# Patient Record
Sex: FEMALE | Race: WHITE | URBAN - METROPOLITAN AREA
[De-identification: names, ages, dates, MRNs, and addresses within clinical notes are randomized per-mention and may not be internally consistent; named-entity substitution may affect disease eponyms.]

---
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
## 2025-06-09 ENCOUNTER — TRANSCRIBE ORDERS (OUTPATIENT)
Dept: OTHER | Age: 20
End: 2025-06-09

## 2025-06-09 DIAGNOSIS — Z13.6 SCREENING FOR CARDIOVASCULAR CONDITION: Primary | ICD-10-CM

## 2025-06-24 ENCOUNTER — OFFICE VISIT (OUTPATIENT)
Dept: CARDIOLOGY | Facility: CLINIC | Age: 20
End: 2025-06-24
Attending: OBSTETRICS & GYNECOLOGY
Payer: COMMERCIAL

## 2025-06-24 VITALS
HEIGHT: 70 IN | HEART RATE: 64 BPM | BODY MASS INDEX: 23.03 KG/M2 | WEIGHT: 160.9 LBS | DIASTOLIC BLOOD PRESSURE: 70 MMHG | SYSTOLIC BLOOD PRESSURE: 102 MMHG | RESPIRATION RATE: 16 BRPM

## 2025-06-24 DIAGNOSIS — Z82.49 FAMILY HISTORY OF ISCHEMIC HEART DISEASE: Primary | ICD-10-CM

## 2025-06-24 DIAGNOSIS — Z13.6 SCREENING FOR CARDIOVASCULAR CONDITION: ICD-10-CM

## 2025-06-24 DIAGNOSIS — Z82.49 FAMILY HISTORY OF HEART VALVE INSUFFICIENCY: ICD-10-CM

## 2025-06-24 LAB — APO A-I SERPL-MCNC: 18 MG/DL

## 2025-06-24 PROCEDURE — 3078F DIAST BP <80 MM HG: CPT | Performed by: STUDENT IN AN ORGANIZED HEALTH CARE EDUCATION/TRAINING PROGRAM

## 2025-06-24 PROCEDURE — 83695 ASSAY OF LIPOPROTEIN(A): CPT | Performed by: STUDENT IN AN ORGANIZED HEALTH CARE EDUCATION/TRAINING PROGRAM

## 2025-06-24 PROCEDURE — 99203 OFFICE O/P NEW LOW 30 MIN: CPT | Performed by: STUDENT IN AN ORGANIZED HEALTH CARE EDUCATION/TRAINING PROGRAM

## 2025-06-24 PROCEDURE — 36415 COLL VENOUS BLD VENIPUNCTURE: CPT | Performed by: STUDENT IN AN ORGANIZED HEALTH CARE EDUCATION/TRAINING PROGRAM

## 2025-06-24 PROCEDURE — 93000 ELECTROCARDIOGRAM COMPLETE: CPT | Performed by: GENERAL ACUTE CARE HOSPITAL

## 2025-06-24 PROCEDURE — G2211 COMPLEX E/M VISIT ADD ON: HCPCS | Performed by: STUDENT IN AN ORGANIZED HEALTH CARE EDUCATION/TRAINING PROGRAM

## 2025-06-24 PROCEDURE — 3074F SYST BP LT 130 MM HG: CPT | Performed by: STUDENT IN AN ORGANIZED HEALTH CARE EDUCATION/TRAINING PROGRAM

## 2025-06-24 RX ORDER — LEVONORGESTREL 19.5 MG/1
1 INTRAUTERINE DEVICE INTRAUTERINE
COMMUNITY

## 2025-06-24 NOTE — PATIENT INSTRUCTIONS
It was a pleasure meeting you today    In summary:    We will get an echocardiogram, ECG, and lipoprotein A to assess your cardiac risk.    Please call my nurse John Lindquist at [phone] if you have any questions or issues.    We will schedule a follow up visit in  the future if needed    Solitario Waldrop DO [facility]  Non-invasive Cardiologist  [facility]

## 2025-06-24 NOTE — LETTER
"2025    Yokasta Black MD  [address] [facility] [address]    RE: Gladys Isabel       Dear Colleague,     I had the pleasure of seeing Gladys Isabel in the [facility].    [facility]   [address]  [place] [id number]   [email or web address]   OFFICE: [phone]     CARDIOLOGY CLINIC NOTE     Thank you, Dr. Sarah Black, Yokasta Ramirez, for asking the [facility] team to see Ms. Gladys Isabel to evaluate New Patient           History of Present Illness   Ms. Gladys Isabel is a 20 year old female with no significant past history who presents for evaluation of cardiac risk.  The patient feels well, is active, has no symptoms.  The patient notes there is significant cardiac family history on both sides.  Her paternal grandfather  in his 40s or 50s from an MI.  Her maternal grandfather had an MI in his 70s, developed CHF and has a cardiac device.  Her maternal aunt suffered cardiac arrest in her 40s, and again in her 50s.  She was found to have a mitral valve problem and underwent MV replacement.  She also developed CHF.  The patient is unsure if she has CAD or not.           Medications  Allergies   Current Outpatient Medications   Medication Sig Dispense Refill     levonorgestrel (KYLEENA) 19.5 MG IUD 1 Intra Uterine Device by Intrauterine route.        No Known Allergies     Physical Examination Review of Systems   Vitals: /70 (BP Location: Right arm, Patient Position: Sitting, Cuff Size: Adult Regular)   Pulse 64   Resp 16   Ht 1.778 m (5' 10\")   Wt 73 kg (160 lb 14.4 oz)   LMP  (Approximate)   BMI 23.09 kg/m    BMI= Body mass index is 23.09 kg/m .  Wt Readings from Last 3 Encounters:   25 73 kg (160 lb 14.4 oz)       General: pleasant female. No acute distress.   Neck: No JVD  Lungs: clear to auscultation  COR:  regular rate and rhythm, No murmurs, rubs, or gallops  Extrem: No " "edema        Please refer above for cardiac ROS details.       Past History     Family History: No family history on file.     Social History:   Social History     Socioeconomic History     Marital status: Single     Spouse name: Not on file     Number of children: Not on file     Years of education: Not on file     Highest education level: Not on file   Occupational History     Not on file   Tobacco Use     Smoking status: Former     Types: Cigarettes     Passive exposure: Past     Smokeless tobacco: Never   Substance and Sexual Activity     Alcohol use: Yes     Comment: occ.     Drug use: Never     Sexual activity: Not on file   Other Topics Concern     Not on file   Social History Narrative     Not on file     Social Drivers of Health     Financial Resource Strain: Not on file   Food Insecurity: Low Risk  (2/1/2023)    Received from [facility]    Food Insecurity      Have there been times that your food ran out, and you didn't have money to get more?: No      Are there times that you worry that this might happen?: No   Transportation Needs: Low Risk  (2/1/2023)    Received from [facility]    Transportation Needs      Do you have trouble getting transportation to medical appointments?: No      How do you normally get to and from your appointments?: Not on file   Physical Activity: Not on file   Stress: Not on file   Social Connections: Not on file   Interpersonal Safety: Not on file   Housing Stability: Not on file (2/1/2023)            Lab Results    Chemistry/lipid CBC Cardiac Enzymes/BNP/TSH/INR   No results found for: \"CHOL\", \"HDL\", \"TRIG\", \"CHOLHDL\", \"CREATININE\", \"BUN\", \"NA\", \"CO2\" No results found for: \"WBC\", \"HGB\", \"HCT\", \"MCV\", \"PLT\" Lab Results   Component Value Date    TSH 0.70 12/11/2020          Cardiac Problems and Cardiac Diagnostics     Most Recent Cardiac testing:  ECG Personal interpretation  6/24/2025  NSR         Assessment/Recommendations "   Assessment:    Ms. Gladys Isabel is a 20 year old female with no significant past history who presents for evaluation of cardiac risk.     Family history of CAD   - Reviewed lipid profile which was normal   - Check LP(a)    Family history of mitral valve disorder   - Given that MVP can run in families will check an echocardiogram    RTC if needed based on results of testing         Solitario Waldrop DO [facility]  Non-invasive Cardiologist  [facility]               Thank you for allowing me to participate in the care of your patient.      Sincerely,     Solitario Waldrop DO     [facility]  cc:   Yokasta Black MD  [address] [place] [address]

## 2025-06-24 NOTE — PROGRESS NOTES
"  [facility]   [address]  [place] [address]   [email or web address]   OFFICE: [phone]     CARDIOLOGY CLINIC NOTE     Thank you, Dr. Sarah Black, Yokasta Ramirez, for asking the [facility] team to see Ms. Gladys Isabel to evaluate New Patient           History of Present Illness   Ms. Gladys Isabel is a 20 year old female with no significant past history who presents for evaluation of cardiac risk.  The patient feels well, is active, has no symptoms.  The patient notes there is significant cardiac family history on both sides.  Her paternal grandfather  in his 40s or 50s from an MI.  Her maternal grandfather had an MI in his 70s, developed CHF and has a cardiac device.  Her maternal aunt suffered cardiac arrest in her 40s, and again in her 50s.  She was found to have a mitral valve problem and underwent MV replacement.  She also developed CHF.  The patient is unsure if she has CAD or not.           Medications  Allergies   Current Outpatient Medications   Medication Sig Dispense Refill    levonorgestrel (KYLEENA) 19.5 MG IUD 1 Intra Uterine Device by Intrauterine route.        No Known Allergies     Physical Examination Review of Systems   Vitals: /70 (BP Location: Right arm, Patient Position: Sitting, Cuff Size: Adult Regular)   Pulse 64   Resp 16   Ht 1.778 m (5' 10\")   Wt 73 kg (160 lb 14.4 oz)   LMP  (Approximate)   BMI 23.09 kg/m    BMI= Body mass index is 23.09 kg/m .  Wt Readings from Last 3 Encounters:   25 73 kg (160 lb 14.4 oz)       General: pleasant female. No acute distress.   Neck: No JVD  Lungs: clear to auscultation  COR:  regular rate and rhythm, No murmurs, rubs, or gallops  Extrem: No edema        Please refer above for cardiac ROS details.       Past History     Family History: No family history on file.     Social History:   Social History     Socioeconomic History    Marital status: Single     " "Spouse name: Not on file    Number of children: Not on file    Years of education: Not on file    Highest education level: Not on file   Occupational History    Not on file   Tobacco Use    Smoking status: Former     Types: Cigarettes     Passive exposure: Past    Smokeless tobacco: Never   Substance and Sexual Activity    Alcohol use: Yes     Comment: occ.    Drug use: Never    Sexual activity: Not on file   Other Topics Concern    Not on file   Social History Narrative    Not on file     Social Drivers of Health     Financial Resource Strain: Not on file   Food Insecurity: Low Risk  (2/1/2023)    Received from [facility]    Food Insecurity     Have there been times that your food ran out, and you didn't have money to get more?: No     Are there times that you worry that this might happen?: No   Transportation Needs: Low Risk  (2/1/2023)    Received from [facility]    Transportation Needs     Do you have trouble getting transportation to medical appointments?: No     How do you normally get to and from your appointments?: Not on file   Physical Activity: Not on file   Stress: Not on file   Social Connections: Not on file   Interpersonal Safety: Not on file   Housing Stability: Not on file (2/1/2023)            Lab Results    Chemistry/lipid CBC Cardiac Enzymes/BNP/TSH/INR   No results found for: \"CHOL\", \"HDL\", \"TRIG\", \"CHOLHDL\", \"CREATININE\", \"BUN\", \"NA\", \"CO2\" No results found for: \"WBC\", \"HGB\", \"HCT\", \"MCV\", \"PLT\" Lab Results   Component Value Date    TSH 0.70 12/11/2020          Cardiac Problems and Cardiac Diagnostics     Most Recent Cardiac testing:  ECG Personal interpretation  6/24/2025  NSR         Assessment/Recommendations   Assessment:    Ms. Gladys Isabel is a 20 year old female with no significant past history who presents for evaluation of cardiac risk.     Family history of CAD   - Reviewed lipid profile which was normal   - Check LP(a)    Family history " of mitral valve disorder   - Given that MVP can run in families will check an echocardiogram    RTC if needed based on results of testing         Solitario Waldrop DO FACC  Non-invasive Cardiologist  [facility]

## 2025-06-25 ENCOUNTER — RESULTS FOLLOW-UP (OUTPATIENT)
Dept: CARDIOLOGY | Facility: CLINIC | Age: 20
End: 2025-06-25
Payer: COMMERCIAL

## 2025-06-25 LAB
ATRIAL RATE - MUSE: 78 BPM
DIASTOLIC BLOOD PRESSURE - MUSE: NORMAL MMHG
INTERPRETATION ECG - MUSE: NORMAL
P AXIS - MUSE: 45 DEGREES
PR INTERVAL - MUSE: 132 MS
QRS DURATION - MUSE: 84 MS
QT - MUSE: 402 MS
QTC - MUSE: 458 MS
R AXIS - MUSE: 70 DEGREES
SYSTOLIC BLOOD PRESSURE - MUSE: NORMAL MMHG
T AXIS - MUSE: 53 DEGREES
VENTRICULAR RATE- MUSE: 78 BPM

## 2025-07-10 ENCOUNTER — HOSPITAL ENCOUNTER (OUTPATIENT)
Dept: CARDIOLOGY | Facility: CLINIC | Age: 20
End: 2025-07-10
Attending: STUDENT IN AN ORGANIZED HEALTH CARE EDUCATION/TRAINING PROGRAM
Payer: COMMERCIAL

## 2025-07-10 DIAGNOSIS — Z82.49 FAMILY HISTORY OF ISCHEMIC HEART DISEASE: ICD-10-CM

## 2025-07-10 DIAGNOSIS — Z13.6 SCREENING FOR CARDIOVASCULAR CONDITION: ICD-10-CM

## 2025-07-10 DIAGNOSIS — Z82.49 FAMILY HISTORY OF HEART VALVE INSUFFICIENCY: ICD-10-CM

## 2025-07-10 LAB — LVEF ECHO: NORMAL

## 2025-07-10 PROCEDURE — 93306 TTE W/DOPPLER COMPLETE: CPT
